# Patient Record
Sex: FEMALE | Race: ASIAN
[De-identification: names, ages, dates, MRNs, and addresses within clinical notes are randomized per-mention and may not be internally consistent; named-entity substitution may affect disease eponyms.]

---

## 2020-09-14 ENCOUNTER — ERX REFILL RESPONSE (OUTPATIENT)
Age: 40
End: 2020-09-14

## 2020-09-14 RX ORDER — TENOFOVIR DISPROXIL FUMARATE 300 MG/1
TAKE ONE TABLET BY MOUTH DAILY WITH MEAL TABLET ORAL
Qty: 90 | Refills: 0

## 2021-06-08 ENCOUNTER — OFFICE VISIT (OUTPATIENT)
Dept: URBAN - METROPOLITAN AREA CLINIC 23 | Facility: CLINIC | Age: 41
End: 2021-06-08
Payer: COMMERCIAL

## 2021-06-08 ENCOUNTER — WEB ENCOUNTER (OUTPATIENT)
Dept: URBAN - METROPOLITAN AREA CLINIC 23 | Facility: CLINIC | Age: 41
End: 2021-06-08

## 2021-06-08 DIAGNOSIS — B18.1 CHRONIC HEPATITIS B: ICD-10-CM

## 2021-06-08 PROCEDURE — 99204 OFFICE O/P NEW MOD 45 MIN: CPT | Performed by: INTERNAL MEDICINE

## 2021-06-08 RX ORDER — TENOFOVIR DISPROXIL FUMARATE 300 MG/1
TAKE ONE TABLET BY MOUTH DAILY WITH MEAL TABLET ORAL
Qty: 90 | Refills: 0 | Status: ACTIVE | COMMUNITY

## 2021-06-08 RX ORDER — TENOFOVIR ALAFENAMIDE 25 MG/1
1 TABLET WITH FOOD TABLET ORAL ONCE A DAY
Qty: 30 | Refills: 2 | OUTPATIENT
Start: 2021-06-08 | End: 2021-09-06

## 2021-06-08 NOTE — HPI-TODAY'S VISIT:
41-year-old female with a history of hepatitis B on via read she was diagnosed at age of 15 during college enrollment she has been on Viiread 300 mg  for years.  She is here today for follow-up last seen in 2018.  She has recent lab at Dr. Valdez office which revealed normal liver function test.  She had mild increase in neutrophils.  She denies any new GI symptoms no nausea no vomiting no abdominal pain no family history of liver disease

## 2021-06-15 ENCOUNTER — TELEPHONE ENCOUNTER (OUTPATIENT)
Dept: URBAN - METROPOLITAN AREA CLINIC 23 | Facility: CLINIC | Age: 41
End: 2021-06-15

## 2021-06-16 ENCOUNTER — TELEPHONE ENCOUNTER (OUTPATIENT)
Dept: URBAN - METROPOLITAN AREA CLINIC 23 | Facility: CLINIC | Age: 41
End: 2021-06-16

## 2021-06-21 LAB
ALBUMIN: 4.5
ALKALINE PHOSPHATASE: 92
ALT (SGPT): 17
AST (SGOT): 25
BASO (ABSOLUTE): 0
BASOS: 1
BILIRUBIN, DIRECT: 0.12
BILIRUBIN, TOTAL: 0.4
EOS (ABSOLUTE): 0.1
EOS: 3
HBSAG SCREEN: POSITIVE
HBV IU/ML: (no result)
HEMATOCRIT: 42.7
HEMATOLOGY COMMENTS:: (no result)
HEMOGLOBIN: 13.7
HEP BE AB: POSITIVE
HEP BE AG: NEGATIVE
IMMATURE CELLS: (no result)
IMMATURE GRANS (ABS): 0
IMMATURE GRANULOCYTES: 0
LOG10 HBV IU/ML: (no result)
LYMPHS (ABSOLUTE): 2
LYMPHS: 48
MCH: 28.8
MCHC: 32.1
MCV: 90
MONOCYTES(ABSOLUTE): 0.3
MONOCYTES: 7
NEUTROPHILS (ABSOLUTE): 1.7
NEUTROPHILS: 41
NRBC: (no result)
PLATELETS: 218
PROTEIN, TOTAL: 7
RBC: 4.75
RDW: 12.3
TEST INFORMATION:: (no result)
WBC: 4.2

## 2021-06-25 ENCOUNTER — WEB ENCOUNTER (OUTPATIENT)
Dept: URBAN - METROPOLITAN AREA CLINIC 23 | Facility: CLINIC | Age: 41
End: 2021-06-25

## 2021-11-08 ENCOUNTER — TELEPHONE ENCOUNTER (OUTPATIENT)
Dept: URBAN - METROPOLITAN AREA CLINIC 23 | Facility: CLINIC | Age: 41
End: 2021-11-08

## 2021-11-08 RX ORDER — TENOFOVIR DISPROXIL FUMARATE 300 MG/1
1 TABLET TABLET ORAL ONCE A DAY
Qty: 90 | Refills: 3
Start: 2021-11-08

## 2021-11-09 ENCOUNTER — TELEPHONE ENCOUNTER (OUTPATIENT)
Dept: URBAN - METROPOLITAN AREA CLINIC 23 | Facility: CLINIC | Age: 41
End: 2021-11-09

## 2021-11-09 RX ORDER — TENOFOVIR ALAFENAMIDE 25 MG/1
1 TABLET WITH FOOD TABLET ORAL ONCE A DAY
Qty: 90 | Refills: 3 | OUTPATIENT
Start: 2021-11-10 | End: 2022-11-05

## 2022-01-18 ENCOUNTER — OFFICE VISIT (OUTPATIENT)
Dept: URBAN - METROPOLITAN AREA CLINIC 23 | Facility: CLINIC | Age: 42
End: 2022-01-18
Payer: COMMERCIAL

## 2022-01-18 ENCOUNTER — WEB ENCOUNTER (OUTPATIENT)
Dept: URBAN - METROPOLITAN AREA CLINIC 23 | Facility: CLINIC | Age: 42
End: 2022-01-18

## 2022-01-18 ENCOUNTER — DASHBOARD ENCOUNTERS (OUTPATIENT)
Age: 42
End: 2022-01-18

## 2022-01-18 DIAGNOSIS — Z3A.08 8 WEEKS GESTATION OF PREGNANCY: ICD-10-CM

## 2022-01-18 DIAGNOSIS — B18.1 CHRONIC HEPATITIS B: ICD-10-CM

## 2022-01-18 PROCEDURE — 99214 OFFICE O/P EST MOD 30 MIN: CPT | Performed by: INTERNAL MEDICINE

## 2022-01-18 RX ORDER — TENOFOVIR DISPROXIL FUMARATE 300 MG/1
1 TABLET TABLET ORAL ONCE A DAY
Qty: 90 | Refills: 3 | Status: ON HOLD | COMMUNITY
Start: 2021-11-08

## 2022-01-18 RX ORDER — TENOFOVIR DISPROXIL FUMARATE 300 MG/1
1 TABLET TABLET ORAL ONCE A DAY
OUTPATIENT
Start: 2021-11-08

## 2022-01-18 RX ORDER — TENOFOVIR ALAFENAMIDE 25 MG/1
1 TABLET WITH FOOD TABLET ORAL ONCE A DAY
Qty: 90 | Refills: 3 | Status: ACTIVE | COMMUNITY
Start: 2021-11-10 | End: 2022-11-05

## 2022-01-18 NOTE — HPI-TODAY'S VISIT:
41-year-old female with a history of hepatitis B on vemlidy since August 2021 she was on  viread before that she was diagnosed at age of 15 during college enrollment she has been on Viiread 300 mg  for years.  She is here today for follow-up l her last liver test in August 2021 revealed undetected viral load her creatinine was normal overall she is doing well no new symptoms her most recent liver enzyme were normal She had ultrasound in August which was normal She is pregnant currently she is week 6 pregnancy

## 2022-01-19 ENCOUNTER — TELEPHONE ENCOUNTER (OUTPATIENT)
Dept: URBAN - METROPOLITAN AREA CLINIC 23 | Facility: CLINIC | Age: 42
End: 2022-01-19

## 2022-01-20 PROBLEM — 61977001 CHRONIC TYPE B VIRAL HEPATITIS: Status: ACTIVE | Noted: 2021-06-08

## 2022-01-20 LAB
A/G RATIO: 1.7
ALBUMIN: 4.7
ALKALINE PHOSPHATASE: 73
AST (SGOT): 23
BASO (ABSOLUTE): 0
BASOS: 1
BILIRUBIN, TOTAL: 0.4
BUN/CREATININE RATIO: 14
BUN: 9
CALCIUM: 9.3
CARBON DIOXIDE, TOTAL: 23
CHLORIDE: 103
CREATININE: 0.65
EGFR IF AFRICN AM: 128
EGFR IF NONAFRICN AM: 111
EOS (ABSOLUTE): 0.1
EOS: 4
GLOBULIN, TOTAL: 2.7
GLUCOSE: 90
HBSAG SCREEN: POSITIVE
HBV IU/ML: (no result)
HEMATOCRIT: 43.2
HEMATOLOGY COMMENTS:: (no result)
HEMOGLOBIN: 13.9
HEP BE AB: POSITIVE
HEP BE AG: NEGATIVE
IMMATURE CELLS: (no result)
IMMATURE GRANS (ABS): 0
IMMATURE GRANULOCYTES: 0
LOG10 HBV IU/ML: (no result)
LYMPHS (ABSOLUTE): 1.8
LYMPHS: 49
MCH: 29
MCHC: 32.2
MCV: 90
MONOCYTES(ABSOLUTE): 0.3
MONOCYTES: 9
NEUTROPHILS (ABSOLUTE): 1.4
NEUTROPHILS: 37
NRBC: (no result)
PLATELETS: 195
POTASSIUM: 4.4
PROTEIN, TOTAL: 7.4
RBC: 4.8
RDW: 12.6
SODIUM: 140
TEST INFORMATION:: (no result)
WBC: 3.7

## 2022-01-21 ENCOUNTER — WEB ENCOUNTER (OUTPATIENT)
Dept: URBAN - METROPOLITAN AREA CLINIC 23 | Facility: CLINIC | Age: 42
End: 2022-01-21

## 2022-01-26 ENCOUNTER — TELEPHONE ENCOUNTER (OUTPATIENT)
Dept: URBAN - METROPOLITAN AREA CLINIC 23 | Facility: CLINIC | Age: 42
End: 2022-01-26

## 2022-02-22 ENCOUNTER — TELEPHONE ENCOUNTER (OUTPATIENT)
Dept: URBAN - METROPOLITAN AREA CLINIC 23 | Facility: CLINIC | Age: 42
End: 2022-02-22

## 2022-02-22 RX ORDER — TENOFOVIR DISOPROXIL FUMARATE 300 MG
1 TABLET TABLET ORAL ONCE A DAY
Qty: 90 TABLET | Refills: 3 | OUTPATIENT
Start: 2022-02-23

## 2023-03-06 ENCOUNTER — TELEPHONE ENCOUNTER (OUTPATIENT)
Dept: URBAN - METROPOLITAN AREA CLINIC 23 | Facility: CLINIC | Age: 43
End: 2023-03-06

## 2023-03-06 ENCOUNTER — ERX REFILL RESPONSE (OUTPATIENT)
Dept: URBAN - METROPOLITAN AREA CLINIC 23 | Facility: CLINIC | Age: 43
End: 2023-03-06

## 2023-03-06 RX ORDER — TENOFOVIR DISOPROXIL FUMARATE 300 MG/1
TAKE ONE TABLET BY MOUTH ONCE DAILY TABLET, FILM COATED ORAL
Qty: 90 TABLET | Refills: 0 | OUTPATIENT

## 2023-03-06 RX ORDER — TENOFOVIR DISOPROXIL FUMARATE 300 MG/1
1 TABLET TABLET, FILM COATED ORAL ONCE A DAY
Qty: 90 TABLET | Refills: 3
Start: 2023-03-06

## 2023-03-06 RX ORDER — TENOFOVIR DISOPROXIL FUMARATE 300 MG/1
TAKE ONE TABLET BY MOUTH ONCE DAILY TABLET, FILM COATED ORAL
Qty: 90 TABLET | Refills: 1 | OUTPATIENT